# Patient Record
Sex: FEMALE | Race: OTHER
[De-identification: names, ages, dates, MRNs, and addresses within clinical notes are randomized per-mention and may not be internally consistent; named-entity substitution may affect disease eponyms.]

---

## 2019-11-12 ENCOUNTER — HOSPITAL ENCOUNTER (OUTPATIENT)
Dept: HOSPITAL 61 - INTRAD | Age: 54
Discharge: HOME | End: 2019-11-12
Attending: INTERNAL MEDICINE
Payer: OTHER GOVERNMENT

## 2019-11-12 VITALS — HEIGHT: 63 IN | BODY MASS INDEX: 26.22 KG/M2 | WEIGHT: 148 LBS

## 2019-11-12 VITALS — SYSTOLIC BLOOD PRESSURE: 99 MMHG | DIASTOLIC BLOOD PRESSURE: 66 MMHG

## 2019-11-12 VITALS — SYSTOLIC BLOOD PRESSURE: 125 MMHG | DIASTOLIC BLOOD PRESSURE: 73 MMHG

## 2019-11-12 VITALS
SYSTOLIC BLOOD PRESSURE: 102 MMHG | DIASTOLIC BLOOD PRESSURE: 67 MMHG | DIASTOLIC BLOOD PRESSURE: 67 MMHG | SYSTOLIC BLOOD PRESSURE: 102 MMHG

## 2019-11-12 VITALS — SYSTOLIC BLOOD PRESSURE: 117 MMHG | DIASTOLIC BLOOD PRESSURE: 71 MMHG

## 2019-11-12 VITALS — SYSTOLIC BLOOD PRESSURE: 104 MMHG | DIASTOLIC BLOOD PRESSURE: 71 MMHG

## 2019-11-12 VITALS — SYSTOLIC BLOOD PRESSURE: 157 MMHG | DIASTOLIC BLOOD PRESSURE: 85 MMHG

## 2019-11-12 VITALS — SYSTOLIC BLOOD PRESSURE: 94 MMHG | DIASTOLIC BLOOD PRESSURE: 75 MMHG

## 2019-11-12 VITALS — DIASTOLIC BLOOD PRESSURE: 81 MMHG | SYSTOLIC BLOOD PRESSURE: 120 MMHG

## 2019-11-12 DIAGNOSIS — D70.9: Primary | ICD-10-CM

## 2019-11-12 DIAGNOSIS — Z79.01: ICD-10-CM

## 2019-11-12 LAB
% ATYL: 3 % (ref 0–0)
% BANDS: 1 % (ref 0–9)
% LYMPHS: 57 % (ref 24–48)
% MONOS: 7 % (ref 0–10)
% MYELOS: 1 % (ref 0–0)
% SEGS: 24 % (ref 35–66)
ALBUMIN SERPL-MCNC: 3.8 G/DL (ref 3.4–5)
ALBUMIN/GLOB SERPL: 1.1 {RATIO} (ref 1–1.7)
ALP SERPL-CCNC: 74 U/L (ref 46–116)
ALT SERPL-CCNC: 35 U/L (ref 14–59)
ANION GAP SERPL CALC-SCNC: 11 MMOL/L (ref 6–14)
AST SERPL-CCNC: 23 U/L (ref 15–37)
BASOPHILS # BLD AUTO: 0 X10^3/UL (ref 0–0.2)
BASOPHILS NFR BLD AUTO: 1 % (ref 0–3)
BASOPHILS NFR BLD: 1 % (ref 0–3)
BILIRUB SERPL-MCNC: 0.5 MG/DL (ref 0.2–1)
BUN SERPL-MCNC: 16 MG/DL (ref 7–20)
BUN/CREAT SERPL: 20 (ref 6–20)
CALCIUM SERPL-MCNC: 8.7 MG/DL (ref 8.5–10.1)
CHLORIDE SERPL-SCNC: 106 MMOL/L (ref 98–107)
CO2 SERPL-SCNC: 25 MMOL/L (ref 21–32)
CREAT SERPL-MCNC: 0.8 MG/DL (ref 0.6–1)
EOSINOPHIL NFR BLD AUTO: 6 % (ref 0–5)
EOSINOPHIL NFR BLD: 0.2 X10^3/UL (ref 0–0.7)
EOSINOPHIL NFR BLD: 5 % (ref 0–3)
ERYTHROCYTE [DISTWIDTH] IN BLOOD BY AUTOMATED COUNT: 12.3 % (ref 11.5–14.5)
GFR SERPLBLD BASED ON 1.73 SQ M-ARVRAT: 74.5 ML/MIN
GLOBULIN SER-MCNC: 3.5 G/DL (ref 2.2–3.8)
GLUCOSE SERPL-MCNC: 105 MG/DL (ref 70–99)
HCT VFR BLD CALC: 37.9 % (ref 36–47)
HGB BLD-MCNC: 13.4 G/DL (ref 12–15.5)
LYMPHOCYTES # BLD: 2.9 X10^3/UL (ref 1–4.8)
LYMPHOCYTES NFR BLD AUTO: 66 % (ref 24–48)
MCH RBC QN AUTO: 31 PG (ref 25–35)
MCHC RBC AUTO-ENTMCNC: 35 G/DL (ref 31–37)
MCV RBC AUTO: 87 FL (ref 79–100)
MONO #: 0.4 X10^3/UL (ref 0–1.1)
MONOCYTES NFR BLD: 8 % (ref 0–9)
NEUT #: 0.9 X10^3/UL (ref 1.8–7.7)
NEUTROPHILS NFR BLD AUTO: 20 % (ref 31–73)
PLATELET # BLD AUTO: 218 X10^3/UL (ref 140–400)
PLATELET # BLD EST: ADEQUATE 10*3/UL
POTASSIUM SERPL-SCNC: 3.8 MMOL/L (ref 3.5–5.1)
PROT SERPL-MCNC: 7.3 G/DL (ref 6.4–8.2)
PROTHROMBIN TIME: 12.7 SEC (ref 11.7–14)
RBC # BLD AUTO: 4.33 X10^6/UL (ref 3.5–5.4)
SODIUM SERPL-SCNC: 142 MMOL/L (ref 136–145)
WBC # BLD AUTO: 4.3 X10^3/UL (ref 4–11)

## 2019-11-12 PROCEDURE — 88184 FLOWCYTOMETRY/ TC 1 MARKER: CPT

## 2019-11-12 PROCEDURE — 88185 FLOWCYTOMETRY/TC ADD-ON: CPT

## 2019-11-12 PROCEDURE — 88237 TISSUE CULTURE BONE MARROW: CPT

## 2019-11-12 PROCEDURE — 85610 PROTHROMBIN TIME: CPT

## 2019-11-12 PROCEDURE — 85025 COMPLETE CBC W/AUTO DIFF WBC: CPT

## 2019-11-12 PROCEDURE — 77012 CT SCAN FOR NEEDLE BIOPSY: CPT

## 2019-11-12 PROCEDURE — 80053 COMPREHEN METABOLIC PANEL: CPT

## 2019-11-12 PROCEDURE — 85007 BL SMEAR W/DIFF WBC COUNT: CPT

## 2019-11-12 PROCEDURE — 99152 MOD SED SAME PHYS/QHP 5/>YRS: CPT

## 2019-11-12 PROCEDURE — 36415 COLL VENOUS BLD VENIPUNCTURE: CPT

## 2019-11-12 PROCEDURE — 38222 DX BONE MARROW BX & ASPIR: CPT

## 2019-11-12 NOTE — PDOC
Exam








Lazaro





Assistant


Assistant


None





Pre-Procedure Diagnosis


Pre-Procedure Diagnosis


Neutropenia





Post-Procedure Diagnosis


Post-Procedure Diagnosis


Same





Procedure Performed


Procedure Performed


[place] Bx





Type of Anesthesia


Type of Anesthesia


Mod Sed





Estimated Blood Loss


EBL:


2





Specimens


Specimans


aspirate and core





Drain/Tubes


Drains/Tubes


samples given to path





Condition of Patient


Condition of Patient


stable





Disposition


Disposition


return to [facility]. expect discharge.











IVELISSE ARCE MD               Nov 12, 2019 08:40

## 2019-11-12 NOTE — RAD
CT-guided bone marrow biopsy.  11/12/2019 11:48 AM



Indication:   Neutropenia



Discussion: The risks and benefits of the procedure, including but not limited

to, bleeding and infection were discussed patient. Informed consent was

obtained. The patient was brought to the CT scanner and placed in the prone

position. A timeout procedure was performed.  CT imaging of the pelvis

demonstrated left ilium amenable to bone marrow biopsy. The overlying soft

tissues were prepped and draped using maximum sterile barrier technique. 1%

lidocaine without epinephrine was administered for local anesthesia. Under

intermittent CT guidance, an OncControl  needle was advanced into the bone

marrow of the left iliac crest. 2 Aspirates and 1 core biopsy samples were

obtained. Samples were delivered to pathology was present at the time of

procedure. The needle was removed and manual pressure held to achieve

hemostasis. No immediate complications were identified. 



The procedure was performed under conscious sedation including continuous

cardiopulmonary monitoring via dedicated sedation nurse.



Sedation time: 20 minutes



Impression: Successful CT-guided bone marrow biopsy of the left iliac crest .





PQRS Compliance Statement:



One or more of the following individualized dose reduction techniques were

utilized for this examination:

1. Automated exposure control

2. Adjustment of the mA and/or kV according to patient size

3. Use of iterative reconstruction technique

## 2019-11-12 NOTE — PDOC
MODERATE SEDATION ASSESSMENT


RISKS/ALTERNATIVES


Risks/Alternatives


Risks and alternatives of this type of sedation and procedure discussed with:


RISK/ALTERNATIVES:  Patient





H & P ON CHART


H & P


H & P on chart and reviewed for co-morbid conditions and appropriate labs.


H&P ON CHART:  Yes





PREGNANCY STATUS


PREG STATUS ASSESSED:  Yes





MEDS/ALLERGIES REVIEWED


Meds/Allergies Reviewed


Medications and Allergies including time and route of recently administered 

narcotics and sedatives.


MEDS/ALLERGIES REVIEWED:  Yes





ASA RATING


ASA RATING:  II





AIRWAY ASSESSMENT


Airway Assessment


Airway patency, oral function limitations, presence  of caps, crowns, dentures, 

partials, and ability to extend neck assessed.


AIRWAY ASSESSMENT:  Yes





MALLAMPATI SCORE


MALLAMPATI SCORE:  II





PRE-SEDATION ASSESSMENT


PRE-SEDATION ASSESSMENT:  Yes











IVELISSE ARCE MD               Nov 12, 2019 08:39

## 2021-10-21 ENCOUNTER — HOSPITAL ENCOUNTER (OUTPATIENT)
Dept: HOSPITAL 61 - ECHO | Age: 56
End: 2021-10-21
Attending: INTERNAL MEDICINE
Payer: OTHER GOVERNMENT

## 2021-10-21 DIAGNOSIS — I10: ICD-10-CM

## 2021-10-21 DIAGNOSIS — R00.2: ICD-10-CM

## 2021-10-21 DIAGNOSIS — I34.0: Primary | ICD-10-CM

## 2021-10-21 PROCEDURE — 93306 TTE W/DOPPLER COMPLETE: CPT

## 2021-10-21 NOTE — CARD
MR#: [id number]

Account#: [id number]

Accession#: [id number]

Date of Study: 10/21/2021

Ordering Physician: ULISSES GAMBLE, 

Referring Physician: Portia KEBEDE: [facility]





--------------- APPROVED REPORT --------------





EXAM: Two-dimensional and M-mode echocardiogram with Doppler and color Doppler.



Other Information 

Quality : GoodHR: 57bpm

Rhythm : NSR



INDICATION

Palpitations 



RISK FACTORS

Hypertension 



2D DIMENSIONS 

Left Atrium(2D)3.2 (1.6-4.0cm)IVSd1.0 (0.7-1.1cm)

Aortic Root(2D)3.0 (2.0-3.7cm)LVDd4.9 (3.9-5.9cm)

LVOT Diameter2.0 (1.8-2.4cm)PWd1.0 (0.7-1.1cm)

LVDs3.1 (2.5-4.0cm)FS (%) 36.2 %

SV73.9 ml



Aortic Valve

AoV Peak Hema.121.5cm/sAoV VTI28.7cm

AO Peak GR.5.9mmHgLVOT Peak Hema.104.6cm/s

AO Mean GR.3mmHgAVA (VMAX)2.81cm2



Mitral Valve

MV E Ipvhgdqy33.7cm/sMV E Peak Gr.4mmHg

MV DECEL [id number] A Sotwfuuf325.0cm/s

MV E Mean Gr.2mmHgE/A  Ratio0.7



Pulmonary Valve

PV Peak Lvtewtvi80.2cm/s



Tricuspid Valve

TR P. Wzmxonsd359jy/sTR Peak Gr.22mmHg



Pulmonary Vein

S1 Ebbtfpcm85.6cm/sD2 Pwfoxqtm05.0cm/s



 LEFT VENTRICLE 

The left ventricle is normal size. There is normal left ventricular wall thickness. The left ventricu
lar systolic function is normal and the ejection fraction is within normal range. LV ejection fractio
n of 50 to 55%. There is normal LV segmental wall motion. No left ventricle thrombus noted on this st
udy. There is no ventricular septal defect visualized. There is no left ventricular aneurysm. There i
s no mass noted in the left ventricle.



 RIGHT VENTRICLE 

The right ventricle is normal size. There is normal right ventricular wall thickness. The right ventr
icular systolic function is normal.



 ATRIA 

The left atrium size is normal. The right atrium size is normal.



 AORTIC VALVE 

The aortic valve is normal in structure and function. The aortic valve is tri-cuspid. Doppler and Col
or Flow revealed no significant aortic regurgitation. There is no significant aortic valvular stenosi
s. There is no aortic valvular vegetation.



 MITRAL VALVE 

The mitral valve is normal in structure and function. There is no evidence of mitral valve prolapse. 
There is no mitral valve stenosis. Doppler and Color-flow revealed trace to mild mitral regurgitation
.



 TRICUSPID VALVE 

The tricuspid valve is normal in structure and function. Doppler and Color Flow revealed trace tricus
pid regurgitation. There is no tricuspid valve prolapse or vegetation. There is no tricuspid valve st
enosis.



 PULMONIC VALVE 

The pulmonary valve is normal in structure and function. Doppler and Color Flow revealed no pulmonic 
valvular regurgitation. There is no pulmonic valvular stenosis.



 GREAT VESSELS 

The aortic root is normal in size. The ascending aorta is normal in size. The pulmonary artery is nor
mal. The IVC is normal in size and collapses >50% with inspiration.



 PERICARDIAL EFFUSION 

There is no pleural effusion. There is no evidence of significant pericardial effusion.



Critical Notification

Critical Value: No



<Conclusion>

The left ventricle is normal size.

The left ventricular systolic function is normal and the ejection fraction is within normal range.

LV ejection fraction of 50 to 55%.

Doppler and Color Flow revealed no significant aortic regurgitation.

There is no significant aortic valvular stenosis.

Doppler and Color-flow revealed trace to mild mitral regurgitation.

Doppler and Color Flow revealed trace tricuspid regurgitation.



Signed by : Umer Morales MD

Electronically Approved : 10/21/2021 13:33:45